# Patient Record
(demographics unavailable — no encounter records)

---

## 2024-11-01 NOTE — PHYSICAL EXAM
[TextEntry] : Gen: Pleasant fit appearing man in no distress HEENT: Sclera non-icteric, conjunctiva non-injected; oropharynx clear w/o thrush; nasal mucosa not inflamed Neck: supple w/o cervical LAD; no bruits CV: Regular rate and rhythm, no murmurs, rubs or gallops Lungs: CTAB Extremities: no clubbing, cyanosis or edema Skin: no rash Psych: normal mood and affect Neuro:  non-focal

## 2024-11-01 NOTE — FAMILY HISTORY
[TextEntry] : Mother: when 86 years old had an MI and passed Father:  when 92 years of age Children: Has 7 children: 6 boys and 1 girl

## 2024-11-01 NOTE — ASSESSMENT
[FreeTextEntry1] : 71-year-old man with asthma  #Asthma.  Mild persistent.  Symptoms are present multiple times per week, usually at night.  Interestingly in no symptoms with exercise which patient is able to do on a regular basis and high intensity.  PFTs with moderate obstruction and partial reversibility.  Exhaled nitric oxide moderately was elevated at 42 ppb. Started Symbicort. Felt better but Jose shot up to 125 ppb. Now down to 38 ppb --Continue Symbicort but with a Spacer (provided). Inhaler technique was reviewed, and proper technique was demonstrated to the patient. 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

## 2024-11-01 NOTE — RESULTS/DATA
[TextEntry] : RADIOLOGY     PFT 11/1/24 Jose: 38 ppb  9/20/24 Jose: 125 ppb  9/6/2024 Prebronchodilator FVC: 3.67 L, 88% predicted Prebronchodilator FEV1: 2.13 L, 67% predicted FEV1/FVC: 58% Postbronchodilator FVC: 3.69 L, 89% predicted (+1%) Postbronchodilator FEV1: 2.34 L, 73% predicted (+10%) FEV1/FVC 63% TLC_N25.80 5 L, 84% RV_N2: 2.18 L, 83% predicted DLCO unadjusted for hemoglobin 24.68 units, 94% predicted Impression moderate obstruction with partial bronchodilator response.  Normal lung volumes and diffusing capacity. NIOX 42 ppb (indeterminate   EKG / ECHO     MICRO      BACTERIAL:       MYCOBACTERIAL:           FUNGAL:     PATH     OTHER LABS OF NOTE

## 2024-11-01 NOTE — HISTORY OF PRESENT ILLNESS
[Never] : never [TextBox_4] : 71YM with PMH of asthma presenting today for a new patient visit. 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  9/20/24 Began using symbicort and is feeling better  11/1/24 Feeling well using symbicort 1-2 times per week has no dyspnea no wheezing  no cough